# Patient Record
Sex: MALE | Race: WHITE | Employment: OTHER | RURAL
[De-identification: names, ages, dates, MRNs, and addresses within clinical notes are randomized per-mention and may not be internally consistent; named-entity substitution may affect disease eponyms.]

---

## 2015-03-02 LAB — COLONOSCOPY, EXTERNAL: NORMAL

## 2017-04-11 RX ORDER — AMLODIPINE AND OLMESARTAN MEDOXOMIL 5; 20 MG/1; MG/1
TABLET ORAL
Qty: 30 TAB | Refills: 1 | Status: SHIPPED | OUTPATIENT
Start: 2017-04-11 | End: 2017-05-08 | Stop reason: SDUPTHER

## 2017-05-08 ENCOUNTER — OFFICE VISIT (OUTPATIENT)
Dept: FAMILY MEDICINE CLINIC | Age: 57
End: 2017-05-08

## 2017-05-08 VITALS
HEART RATE: 86 BPM | TEMPERATURE: 97.9 F | BODY MASS INDEX: 35.05 KG/M2 | OXYGEN SATURATION: 97 % | WEIGHT: 244.8 LBS | RESPIRATION RATE: 16 BRPM | DIASTOLIC BLOOD PRESSURE: 70 MMHG | SYSTOLIC BLOOD PRESSURE: 120 MMHG | HEIGHT: 70 IN

## 2017-05-08 DIAGNOSIS — E78.2 MIXED HYPERLIPIDEMIA: ICD-10-CM

## 2017-05-08 DIAGNOSIS — Z00.00 ROUTINE ADULT HEALTH MAINTENANCE: Primary | ICD-10-CM

## 2017-05-08 DIAGNOSIS — I10 ESSENTIAL HYPERTENSION: ICD-10-CM

## 2017-05-08 RX ORDER — AMLODIPINE AND OLMESARTAN MEDOXOMIL 5; 20 MG/1; MG/1
TABLET ORAL
Qty: 90 TAB | Refills: 3 | Status: SHIPPED | OUTPATIENT
Start: 2017-05-08 | End: 2018-05-01 | Stop reason: SDUPTHER

## 2017-05-08 NOTE — PROGRESS NOTES
[address] [address] [facility] [place]   [phone]     5/8/2017  Chief Complaint   Patient presents with    Hypertension    Cholesterol Problem    Medication Refill       HPI   is a 56 y.o. male presents today in routine annual follow up. States he is feeling well and is without concern. Review of Systems   Constitutional: Negative. Cardiovascular: Negative. Gastrointestinal: Negative. Neurological: Negative. Patient Active Problem List   Diagnosis Code    Pure hypercholesterolemia E78.00    Idiopathic angioedema T78. 3XXA      No Known Allergies    Current Outpatient Prescriptions on File Prior to Visit   Medication Sig Dispense Refill    predniSONE (DELTASONE) 10 mg tablet Take 1 tablet by mouth daily. PRN as directed 30 tablet 0    omega-3 fatty acids-vitamin e (FISH OIL) 1,000 mg cap Take 1 Cap by mouth.  aspirin delayed-release 81 mg tablet Take  by mouth daily.  cetirizine (ZYRTEC) 10 mg tablet Take  by mouth daily.  rosuvastatin (CRESTOR) 10 mg tablet take 1 tablet by mouth NIGHTLY 90 Tab 3     No current facility-administered medications on file prior to visit. There were no vitals taken for this visit. Physical Exam   Constitutional: He appears well-developed and well-nourished. No distress. Neck: Neck supple. No thyromegaly present. Cardiovascular: Normal heart sounds. Pulmonary/Chest: Effort normal and breath sounds normal.   Abdominal: Soft. There is no tenderness. Vitals reviewed. Sudhir Choudhury was seen today for hypertension, cholesterol problem and medication refill.     Diagnoses and all orders for this visit:    Routine adult health maintenance  -     CBC WITH AUTOMATED DIFF  -     PROSTATE SPECIFIC AG  -     HEMOGLOBIN A1C WITH EAG    Essential hypertension  -     amLODIPine-Olmesartan 5-20 mg tab; take 1 tablet by mouth once daily for pressure  Indications: hypertension    Mixed hyperlipidemia  -     METABOLIC PANEL, COMPREHENSIVE  -     LIPID PANEL WITH LDL/HDL RATIO      Plan         Follow-up Disposition:  Return in about 1 year (around 5/8/2018).       Aysha Berg PA-C, P

## 2017-05-08 NOTE — MR AVS SNAPSHOT
Visit Information Date & Time Provider Department Dept. Phone Encounter #  
 5/8/2017  9:00 AM Angelygold Vuong, [address] [phone] [id number] Upcoming Health Maintenance Date Due Hepatitis C Screening 1960 DTaP/Tdap/Td series (1 - Tdap) 2/6/1983 FOBT Q 1 YEAR AGE 50-75 2/6/2012 INFLUENZA AGE 9 TO ADULT 8/1/2017 Allergies as of 5/8/2017  Review Complete On: 5/8/2017 By: ESTEFANIA Carpenter No Known Allergies Current Immunizations  Never Reviewed No immunizations on file. Not reviewed this visit You Were Diagnosed With   
  
 Codes Comments Routine adult health maintenance    -  Primary ICD-10-CM: Z00.00 ICD-9-CM: V70.0 Essential hypertension     ICD-10-CM: I10 
ICD-9-CM: 401.9 Mixed hyperlipidemia     ICD-10-CM: E78.2 ICD-9-CM: 272.2 Vitals Smoking Status Never Smoker Preferred Pharmacy Pharmacy Name Phone [facility] [address] [facility], [address] [place] [address] Isacc Arboleda [address] Your Updated Medication List  
  
   
This list is accurate as of: 5/8/17  9:27 AM.  Always use your most recent med list. amLODIPine-Olmesartan 5-20 mg Tab  
take 1 tablet by mouth once daily for pressure  Indications: hypertension  
  
 aspirin delayed-release 81 mg tablet Take  by mouth daily. FISH OIL 1,000 mg Cap Generic drug:  omega-3 fatty acids-vitamin e Take 1 Cap by mouth.  
  
 predniSONE 10 mg tablet Commonly known as:  Alice Shiley Take 1 tablet by mouth daily. PRN as directed  
  
 rosuvastatin 10 mg tablet Commonly known as:  CRESTOR  
take 1 tablet by mouth NIGHTLY ZyrTEC 10 mg tablet Generic drug:  cetirizine Take  by mouth daily. Prescriptions Sent to Pharmacy Refills  
 amLODIPine-Olmesartan 5-20 mg tab 3 Sig: take 1 tablet by mouth once daily for pressure  Indications: hypertension Class: Normal  
 Pharmacy: [id number] [place]  #: [phone] We Performed the Following CBC WITH AUTOMATED DIFF [61805 CPT(R)] HEMOGLOBIN A1C WITH EAG [46782 CPT(R)] LIPID PANEL WITH LDL/HDL RATIO [87297 CPT(R)] METABOLIC PANEL, COMPREHENSIVE [08731 CPT(R)] PROSTATE SPECIFIC AG (PSA) [id number] CPT(R)] Introducing \[facility]\"" & HEALTH SERVICES! Steven Arellano introduces U*tique patient portal. Now you can access parts of your medical record, email your doctor's office, and request medication refills online. 1. In your internet browser, go to [email or web address] 2. Click on the First Time User? Click Here link in the Sign In box. You will see the New Member Sign Up page. 3. Enter your U*tique Access Code exactly as it appears below. You will not need to use this code after youve completed the sign-up process. If you do not sign up before the expiration date, you must request a new code. · U*tique Access Code: [email or web address] Expires: 8/6/2017  9:27 AM 
 
4. Enter the last four digits of your Social Security Number (xxxx) and Date of Birth (mm/dd/yyyy) as indicated and click Submit. You will be taken to the next sign-up page. 5. Create a U*tique ID. This will be your U*tique login ID and cannot be changed, so think of one that is secure and easy to remember. 6. Create a U*tique password. You can change your password at any time. 7. Enter your Password Reset Question and Answer. This can be used at a later time if you forget your password. 8. Enter your e-mail address. You will receive e-mail notification when new information is available in [address] 9. Click Sign Up. You can now view and download portions of your medical record. 10. Click the Download Summary menu link to download a portable copy of your medical information.  
 
If you have questions, please visit the Frequently Asked Questions section of the Purple. Remember, [employer] is NOT to be used for urgent needs. For medical emergencies, dial 911. Now available from your iPhone and Android! Please provide this summary of care documentation to your next provider. Your primary care clinician is listed as Kylie Renee. If you have any questions after today's visit, please call [phone].

## 2017-05-09 LAB
ALBUMIN SERPL-MCNC: 4.6 G/DL (ref 3.5–5.5)
ALBUMIN/GLOB SERPL: 1.6 {RATIO} (ref 1.2–2.2)
ALP SERPL-CCNC: 59 IU/L (ref 39–117)
ALT SERPL-CCNC: 26 IU/L (ref 0–44)
AST SERPL-CCNC: 18 IU/L (ref 0–40)
BASOPHILS # BLD AUTO: 0 X10E3/UL (ref 0–0.2)
BASOPHILS NFR BLD AUTO: 0 %
BILIRUB SERPL-MCNC: 0.6 MG/DL (ref 0–1.2)
BUN SERPL-MCNC: 16 MG/DL (ref 6–24)
BUN/CREAT SERPL: 16 (ref 9–20)
CALCIUM SERPL-MCNC: 9.2 MG/DL (ref 8.7–10.2)
CHLORIDE SERPL-SCNC: 98 MMOL/L (ref 96–106)
CHOLEST SERPL-MCNC: 219 MG/DL (ref 100–199)
CO2 SERPL-SCNC: 21 MMOL/L (ref 18–29)
CREAT SERPL-MCNC: 0.98 MG/DL (ref 0.76–1.27)
EOSINOPHIL # BLD AUTO: 0 X10E3/UL (ref 0–0.4)
EOSINOPHIL NFR BLD AUTO: 0 %
ERYTHROCYTE [DISTWIDTH] IN BLOOD BY AUTOMATED COUNT: 14.4 % (ref 12.3–15.4)
EST. AVERAGE GLUCOSE BLD GHB EST-MCNC: 126 MG/DL
GLOBULIN SER CALC-MCNC: 2.8 G/DL (ref 1.5–4.5)
GLUCOSE SERPL-MCNC: 106 MG/DL (ref 65–99)
HBA1C MFR BLD: 6 % (ref 4.8–5.6)
HCT VFR BLD AUTO: 42.1 % (ref 37.5–51)
HDLC SERPL-MCNC: 66 MG/DL
HGB BLD-MCNC: 14.1 G/DL (ref 12.6–17.7)
IMM GRANULOCYTES # BLD: 0.1 X10E3/UL (ref 0–0.1)
IMM GRANULOCYTES NFR BLD: 1 %
LDLC SERPL CALC-MCNC: 105 MG/DL (ref 0–99)
LDLC/HDLC SERPL: 1.6 RATIO UNITS (ref 0–3.6)
LYMPHOCYTES # BLD AUTO: 1 X10E3/UL (ref 0.7–3.1)
LYMPHOCYTES NFR BLD AUTO: 12 %
MCH RBC QN AUTO: 28.7 PG (ref 26.6–33)
MCHC RBC AUTO-ENTMCNC: 33.5 G/DL (ref 31.5–35.7)
MCV RBC AUTO: 86 FL (ref 79–97)
MONOCYTES # BLD AUTO: 0.5 X10E3/UL (ref 0.1–0.9)
MONOCYTES NFR BLD AUTO: 7 %
NEUTROPHILS # BLD AUTO: 6.7 X10E3/UL (ref 1.4–7)
NEUTROPHILS NFR BLD AUTO: 80 %
PLATELET # BLD AUTO: 245 X10E3/UL (ref 150–379)
POTASSIUM SERPL-SCNC: 4.5 MMOL/L (ref 3.5–5.2)
PROT SERPL-MCNC: 7.4 G/DL (ref 6–8.5)
PSA SERPL-MCNC: 0.5 NG/ML (ref 0–4)
RBC # BLD AUTO: 4.91 X10E6/UL (ref 4.14–5.8)
SODIUM SERPL-SCNC: 137 MMOL/L (ref 134–144)
TRIGL SERPL-MCNC: 240 MG/DL (ref 0–149)
VLDLC SERPL CALC-MCNC: 48 MG/DL (ref 5–40)
WBC # BLD AUTO: 8.4 X10E3/UL (ref 3.4–10.8)

## 2017-05-12 NOTE — PROGRESS NOTES
Cholesterol, triglyceride and LDL levels are elevated but improved from 1 year ago. HDL (good cholesterol) is excellent. Keep up the good work. Blood sugar level is mildly elevated. Early diabetes. Minimize carbohydrates in your diet.    Repeat annually

## 2018-04-27 DIAGNOSIS — I10 ESSENTIAL HYPERTENSION: ICD-10-CM

## 2018-04-27 RX ORDER — ROSUVASTATIN CALCIUM 10 MG/1
TABLET, COATED ORAL
Qty: 90 TAB | Refills: 3 | OUTPATIENT
Start: 2018-04-27

## 2018-04-27 RX ORDER — AMLODIPINE AND OLMESARTAN MEDOXOMIL 5; 20 MG/1; MG/1
TABLET ORAL
Qty: 90 TAB | Refills: 3 | OUTPATIENT
Start: 2018-04-27

## 2018-05-01 ENCOUNTER — OFFICE VISIT (OUTPATIENT)
Dept: FAMILY MEDICINE CLINIC | Age: 58
End: 2018-05-01

## 2018-05-01 VITALS
DIASTOLIC BLOOD PRESSURE: 60 MMHG | HEIGHT: 70 IN | WEIGHT: 250 LBS | BODY MASS INDEX: 35.79 KG/M2 | SYSTOLIC BLOOD PRESSURE: 102 MMHG | RESPIRATION RATE: 16 BRPM | HEART RATE: 60 BPM | TEMPERATURE: 97 F | OXYGEN SATURATION: 97 %

## 2018-05-01 DIAGNOSIS — E66.01 SEVERE OBESITY (BMI 35.0-39.9) WITH COMORBIDITY (HCC): ICD-10-CM

## 2018-05-01 DIAGNOSIS — E78.00 PURE HYPERCHOLESTEROLEMIA: ICD-10-CM

## 2018-05-01 DIAGNOSIS — I10 ESSENTIAL HYPERTENSION: Primary | ICD-10-CM

## 2018-05-01 DIAGNOSIS — Z11.59 ENCOUNTER FOR HEPATITIS C SCREENING TEST FOR LOW RISK PATIENT: ICD-10-CM

## 2018-05-01 RX ORDER — AMLODIPINE AND OLMESARTAN MEDOXOMIL 5; 20 MG/1; MG/1
TABLET ORAL
Qty: 90 TAB | Refills: 3 | Status: SHIPPED | OUTPATIENT
Start: 2018-05-01 | End: 2019-04-16 | Stop reason: SDUPTHER

## 2018-05-01 RX ORDER — ROSUVASTATIN CALCIUM 10 MG/1
TABLET, COATED ORAL
Qty: 90 TAB | Refills: 3 | Status: SHIPPED | OUTPATIENT
Start: 2018-05-01 | End: 2019-04-16 | Stop reason: SDUPTHER

## 2018-05-01 NOTE — PROGRESS NOTES
1. Have you been to the ER, urgent care clinic since your last visit? Hospitalized since your last visit? No    2. Have you seen or consulted any other health care providers outside of the [address] [place] [address] since your last visit? Include any pap smears or colon screening.  No

## 2018-05-01 NOTE — ACP (ADVANCE CARE PLANNING)
Discussed importance of advanced medical directives with patient. Patient is capable of making decisions.   Astrid OLEARY

## 2018-05-01 NOTE — PROGRESS NOTES
Subjective:   Segundo Pruitt is a ribera  Brianne Bill is a 66 y.o. male who presents today with the following:  Chief Complaint   Patient presents with    Hypertension     checkup and refills    Hypertension       Patient Active Problem List   Diagnosis Code    Pure hypercholesterolemia E78.00    Idiopathic angioedema T78. 3XXA    Severe obesity (BMI 35.0-39. 9) with comorbidity ([facility] [address] E66.01         COMPLIANT WITH MEDICATION:   HTN/ hypercholesterolemia; Denies chest pain, dyspnea, palpitations, headache and blurred vision. Blood pressure normotensive. BMI/ Obesity: discussed BMI with him      ROS:  Gen: denies fever, chills, fatigue, weight loss, weight gain  HEENT:denies blurry vision, nasal congestion, sore throat  Resp: denies dypsnea, cough, wheezing  CV: denies chest pain radiating to the jaws or arms, palpitations, lower extremity edema  Abd: denies nausea, vomiting, diarrhea, constipation  Neuro: denies numbness/tingling  Endo: denies polyuria, polydipsia, heat/cold intolerance  Heme: no lymphadenopathy    No Known Allergies      Current Outpatient Prescriptions:     rosuvastatin (CRESTOR) 10 mg tablet, take 1 tablet by mouth NIGHTLY, Disp: 90 Tab, Rfl: 3    amLODIPine-Olmesartan 5-20 mg tab, take 1 tablet by mouth once daily for pressure  Indications: hypertension, Disp: 90 Tab, Rfl: 3    predniSONE (DELTASONE) 10 mg tablet, Take 1 tablet by mouth daily. PRN as directed, Disp: 30 tablet, Rfl: 0    omega-3 fatty acids-vitamin e (FISH OIL) 1,000 mg cap, Take 1 Cap by mouth., Disp: , Rfl:     aspirin delayed-release 81 mg tablet, Take  by mouth daily. , Disp: , Rfl:     cetirizine (ZYRTEC) 10 mg tablet, Take  by mouth daily. , Disp: , Rfl:     Past Medical History:   Diagnosis Date    Hypercholesterolemia     Hypertension     Idiopathic angioedema     obesity        Past Surgical History:   Procedure Laterality Date    HX COLONOSCOPY  4/15    SIS 5 years()       History   Smoking Status    Never Smoker   Smokeless Tobacco    Never Used       Social History     Social History    Marital status: SINGLE     Spouse name: N/A    Number of children: N/A    Years of education: N/A     Social History Main Topics    Smoking status: Never Smoker    Smokeless tobacco: Never Used    Alcohol use Yes    Drug use: No    Sexual activity: Yes     Other Topics Concern    None     Social History Narrative       Family History   Problem Relation Age of Onset    Diabetes Mother     Cancer Brother      colon         Objective:     Visit Vitals    /60 (BP 1 Location: Left arm, BP Patient Position: Sitting)    Pulse 60    Temp 97 °F (36.1 °C) (Temporal)    Resp 16    Ht 5' 9.5\" (1.765 m)    Wt 250 lb (113.4 kg)    SpO2 97%    BMI 36.39 kg/m2     Body mass index is 36.39 kg/(m^2). General: Alert and oriented. No acute distress. Well nourished  HEENT :  Ears:TMs are normal. Canals are clear. Eyes: pupils equal, round, react to light and accommodation. Extra ocular movements intact. Nose: patent. Mouth and throat is clear. Neck:supple full range of motion no thyromegaly. Trachea midline, No carotid bruits. No significant lymphadenopathy  Lungs[de-identified] clear to auscultation without wheezes, rales, or rhonchi. Heart :RRR, S1 & S2 are normal intensity. No murmur; no gallop  Abdomen: bowel sounds active. No tenderness, guarding, rebound, masses, hepatic or spleen enlargement  Back: no CVA tenderness. Extremities: without clubbing, cyanosis, or edema  Pulses: radial and femoral pulses are normal  Neuro: HMF intact.  Cranial nerves II through XII grossly normal.  Motor: is 5 over 5 and symmetrical.   Deep tendon reflexes: +2 equal    Results for orders placed or performed in visit on 05/08/17   CBC WITH AUTOMATED DIFF   Result Value Ref Range    WBC 8.4 3.4 - 10.8 x10E3/uL    RBC 4.91 4.14 - 5.80 x10E6/uL    HGB 14.1 12.6 - 17.7 g/dL    HCT 42.1 37.5 - 51.0 %    MCV 86 79 - 97 fL    MCH 28.7 26.6 - 33.0 pg MCHC 33.5 31.5 - 35.7 g/dL    RDW 14.4 12.3 - 15.4 %    PLATELET 528 230 - 372 x10E3/uL    NEUTROPHILS 80 %    Lymphocytes 12 %    MONOCYTES 7 %    EOSINOPHILS 0 %    BASOPHILS 0 %    ABS. NEUTROPHILS 6.7 1.4 - 7.0 x10E3/uL    Abs Lymphocytes 1.0 0.7 - 3.1 x10E3/uL    ABS. MONOCYTES 0.5 0.1 - 0.9 x10E3/uL    ABS. EOSINOPHILS 0.0 0.0 - 0.4 x10E3/uL    ABS. BASOPHILS 0.0 0.0 - 0.2 x10E3/uL    IMMATURE GRANULOCYTES 1 %    ABS. IMM. GRANS. 0.1 0.0 - 0.1 J85P3/JL   METABOLIC PANEL, COMPREHENSIVE   Result Value Ref Range    Glucose 106 (H) 65 - 99 mg/dL    BUN 16 6 - 24 mg/dL    Creatinine 0.98 0.76 - 1.27 mg/dL    GFR est non-AA 86 >59 mL/min/1.73    GFR est  >59 mL/min/1.73    BUN/Creatinine ratio 16 9 - 20    Sodium 137 134 - 144 mmol/L    Potassium 4.5 3.5 - 5.2 mmol/L    Chloride 98 96 - 106 mmol/L    CO2 21 18 - 29 mmol/L    Calcium 9.2 8.7 - 10.2 mg/dL    Protein, total 7.4 6.0 - 8.5 g/dL    Albumin 4.6 3.5 - 5.5 g/dL    GLOBULIN, TOTAL 2.8 1.5 - 4.5 g/dL    A-G Ratio 1.6 1.2 - 2.2    Bilirubin, total 0.6 0.0 - 1.2 mg/dL    Alk. phosphatase 59 39 - 117 IU/L    AST (SGOT) 18 0 - 40 IU/L    ALT (SGPT) 26 0 - 44 IU/L   PROSTATE SPECIFIC AG (PSA)   Result Value Ref Range    Prostate Specific Ag 0.5 0.0 - 4.0 ng/mL   LIPID PANEL WITH LDL/HDL RATIO   Result Value Ref Range    Cholesterol, total 219 (H) 100 - 199 mg/dL    Triglyceride 240 (H) 0 - 149 mg/dL    HDL Cholesterol 66 >39 mg/dL    VLDL, calculated 48 (H) 5 - 40 mg/dL    LDL, calculated 105 (H) 0 - 99 mg/dL    LDL/HDL Ratio 1.6 0.0 - 3.6 ratio units   HEMOGLOBIN A1C WITH EAG   Result Value Ref Range    Hemoglobin A1c 6.0 (H) 4.8 - 5.6 %    Estimated average glucose 126 mg/dL       No results found for this visit on 05/01/18. Assessment/ Plan:     1. Essential hypertension    - amLODIPine-Olmesartan 5-20 mg tab; take 1 tablet by mouth once daily for pressure  Indications: hypertension  Dispense: 90 Tab;  Refill: 3  - LIPID PANEL  - METABOLIC PANEL, COMPREHENSIVE  - PROSTATE SPECIFIC AG  - TSH 3RD GENERATION  - CBC WITH AUTOMATED DIFF  - COLLECTION VENOUS BLOOD,VENIPUNCTURE    2. BMI 35.0-35.9,adult  Discussed the patient's BMI with him. The BMI follow up plan is as follows:     dietary management education, guidance, and counseling  encourage exercise  monitor weight  prescribed dietary intake    An After Visit Summary was printed and given to the patient.  - LIPID PANEL  - METABOLIC PANEL, COMPREHENSIVE  - PROSTATE SPECIFIC AG  - TSH 3RD GENERATION  - CBC WITH AUTOMATED DIFF  - COLLECTION VENOUS BLOOD,VENIPUNCTURE    3. Severe obesity (BMI 35.0-39. 9) with comorbidity ([address]  Discussed the patient's BMI with him. The BMI follow up plan is as follows:     dietary management education, guidance, and counseling  encourage exercise  monitor weight  prescribed dietary intake    An After Visit Summary was printed and given to the patient.  - LIPID PANEL  - METABOLIC PANEL, COMPREHENSIVE  - PROSTATE SPECIFIC AG  - TSH 3RD GENERATION  - CBC WITH AUTOMATED DIFF  - COLLECTION VENOUS BLOOD,VENIPUNCTURE    4. Pure hypercholesterolemia    - LIPID PANEL  - METABOLIC PANEL, COMPREHENSIVE  - PROSTATE SPECIFIC AG  - TSH 3RD GENERATION  - CBC WITH AUTOMATED DIFF  - COLLECTION VENOUS BLOOD,VENIPUNCTURE    5.  Encounter for hepatitis C screening test for low risk patient    - HEPATITIS C AB      Orders Placed This Encounter    COLLECTION VENOUS BLOOD,VENIPUNCTURE    LIPID PANEL    METABOLIC PANEL, COMPREHENSIVE    PROSTATE SPECIFIC AG    TSH 3RD GENERATION    CBC WITH AUTOMATED DIFF    HEPATITIS C AB    rosuvastatin (CRESTOR) 10 mg tablet     Sig: take 1 tablet by mouth NIGHTLY     Dispense:  90 Tab     Refill:  3    amLODIPine-Olmesartan 5-20 mg tab     Sig: take 1 tablet by mouth once daily for pressure  Indications: hypertension     Dispense:  90 Tab     Refill:  3         Verbal and written instructions (see AVS) provided.  Patient expresses understanding of diagnosis and treatment plan. Health Maintenance Due   Topic Date Due    Hepatitis C Screening  1960     Declined TD immunization today     Follow-up Disposition:  Return in about 1 year (around 5/1/2019). or sooner as needed.        Veena Jackson, FNP-C

## 2018-05-01 NOTE — MR AVS SNAPSHOT
[facility] 
 
 
  [address] [place] [address] [employer] [address] 
[phone] Patient: Enid Solorzano MRN: [id number] :1960 Visit Information Date & Time Provider Department Dept. Phone Encounter #  
 2018  8:00 AM Smith Sanches NP [facility] [address] [facility] [phone] [id number] Follow-up Instructions Return in about 1 year (around 2019). Upcoming Health Maintenance Date Due Hepatitis C Screening 1960 DTaP/Tdap/Td series (1 - Tdap) 10/30/2018* FOBT Q 1 YEAR AGE 50-75 10/30/2018* Influenza Age 5 to Adult 2018 *Topic was postponed. The date shown is not the original due date. Allergies as of 2018  Review Complete On: 2018 By: Smith Sanches NP No Known Allergies Current Immunizations  Never Reviewed No immunizations on file. Not reviewed this visit You Were Diagnosed With   
  
 Codes Comments Essential hypertension    -  Primary ICD-10-CM: I10 
ICD-9-CM: 401.9 BMI 35.0-35.9,adult     ICD-10-CM: W23.48 ICD-9-CM: V85.35 Severe obesity (BMI 35.0-39. 9) with comorbidity ([facility] [address]     ICD-10-CM: E66.01 
ICD-9-CM: 278.01   
 Pure hypercholesterolemia     ICD-10-CM: E78.00 ICD-9-CM: 272.0 Encounter for hepatitis C screening test for low risk patient     ICD-10-CM: Z11.59 
ICD-9-CM: V73.89 Vitals BP Pulse Temp Resp Height(growth percentile) Weight(growth percentile) 102/60 (BP 1 Location: Left arm, BP Patient Position: Sitting) 60 97 °F (36.1 °C) (Temporal) 16 5' 9.5\" (1.765 m) 250 lb (113.4 kg) SpO2 BMI Smoking Status 97% 36.39 kg/m2 Never Smoker BMI and BSA Data Body Mass Index Body Surface Area  
 36.39 kg/m 2 2.36 m 2 Preferred Pharmacy Pharmacy Name Phone [facility] [address] [facility], [address] [place] [address] Isacc Arboleda [address] Your Updated Medication List  
  
   
 This list is accurate as of 5/1/18  8:52 AM.  Always use your most recent med list. amLODIPine-Olmesartan 5-20 mg Tab  
take 1 tablet by mouth once daily for pressure  Indications: hypertension  
  
 aspirin delayed-release 81 mg tablet Take  by mouth daily. FISH OIL 1,000 mg Cap Generic drug:  omega-3 fatty acids-vitamin e Take 1 Cap by mouth.  
  
 predniSONE 10 mg tablet Commonly known as:  Gloria Gong Take 1 tablet by mouth daily. PRN as directed  
  
 rosuvastatin 10 mg tablet Commonly known as:  CRESTOR  
take 1 tablet by mouth NIGHTLY ZyrTEC 10 mg tablet Generic drug:  cetirizine Take  by mouth daily. Prescriptions Sent to Pharmacy Refills  
 rosuvastatin (CRESTOR) 10 mg tablet 3 Sig: take 1 tablet by mouth NIGHTLY Class: Normal  
 Pharmacy: [id number] [facility] [address] [facility] Ph #: [phone]  
 amLODIPine-Olmesartan 5-20 mg tab 3 Sig: take 1 tablet by mouth once daily for pressure  Indications: hypertension Class: Normal  
 Pharmacy: [id number] [place] [address] Ph #: [phone] We Performed the Following CBC WITH AUTOMATED DIFF [84768 CPT(R)] COLLECTION VENOUS BLOOD,VENIPUNCTURE [id number] CPT(R)] HEPATITIS C AB [58882 CPT(R)] LIPID PANEL [86810 CPT(R)] METABOLIC PANEL, COMPREHENSIVE [65246 CPT(R)] PSA, DIAGNOSTIC (PROSTATE SPECIFIC AG) [id number] CPT(R)] TSH 3RD GENERATION [45475 CPT(R)] Follow-up Instructions Return in about 1 year (around 5/1/2019). Introducing [facility] & HEALTH SERVICES! [facility] introduces [employer] patient portal. Now you can access parts of your medical record, email your doctor's office, and request medication refills online. 1. In your internet browser, go to [email or web address] 2. Click on the First Time User? Click Here link in the Sign In box.  You will see the New Member Sign Up page. 3. Enter your Leadspace Access Code exactly as it appears below. You will not need to use this code after youve completed the sign-up process. If you do not sign up before the expiration date, you must request a new code. · [employer] Access Code: [email or web address] Expires: 7/30/2018  8:44 AM 
 
4. Enter the last four digits of your Social Security Number (xxxx) and Date of Birth (mm/dd/yyyy) as indicated and click Submit. You will be taken to the next sign-up page. 5. Create a Friends[employer]t ID. This will be your Leadspace login ID and cannot be changed, so think of one that is secure and easy to remember. 6. Create a Leadspace password. You can change your password at any time. 7. Enter your Password Reset Question and Answer. This can be used at a later time if you forget your password. 8. Enter your e-mail address. You will receive e-mail notification when new information is available in [address] 9. Click Sign Up. You can now view and download portions of your medical record. 10. Click the Download Summary menu link to download a portable copy of your medical information. If you have questions, please visit the Frequently Asked Questions section of the Leadspace website. Remember, [employer] is NOT to be used for urgent needs. For medical emergencies, dial 911. Now available from your iPhone and Android! Please provide this summary of care documentation to your next provider. Your primary care clinician is listed as Angela Renee. If you have any questions after today's visit, please call [phone].

## 2018-05-02 LAB
ALBUMIN SERPL-MCNC: 4.5 G/DL (ref 3.5–5.5)
ALBUMIN/GLOB SERPL: 1.7 {RATIO} (ref 1.2–2.2)
ALP SERPL-CCNC: 57 IU/L (ref 39–117)
ALT SERPL-CCNC: 25 IU/L (ref 0–44)
AST SERPL-CCNC: 21 IU/L (ref 0–40)
BASOPHILS # BLD AUTO: 0 X10E3/UL (ref 0–0.2)
BASOPHILS NFR BLD AUTO: 0 %
BILIRUB SERPL-MCNC: 0.4 MG/DL (ref 0–1.2)
BUN SERPL-MCNC: 21 MG/DL (ref 6–24)
BUN/CREAT SERPL: 21 (ref 9–20)
CALCIUM SERPL-MCNC: 9.1 MG/DL (ref 8.7–10.2)
CHLORIDE SERPL-SCNC: 98 MMOL/L (ref 96–106)
CHOLEST SERPL-MCNC: 266 MG/DL (ref 100–199)
CO2 SERPL-SCNC: 22 MMOL/L (ref 18–29)
CREAT SERPL-MCNC: 0.98 MG/DL (ref 0.76–1.27)
EOSINOPHIL # BLD AUTO: 0.3 X10E3/UL (ref 0–0.4)
EOSINOPHIL NFR BLD AUTO: 4 %
ERYTHROCYTE [DISTWIDTH] IN BLOOD BY AUTOMATED COUNT: 14.7 % (ref 12.3–15.4)
GFR SERPLBLD CREATININE-BSD FMLA CKD-EPI: 86 ML/MIN/1.73
GFR SERPLBLD CREATININE-BSD FMLA CKD-EPI: 99 ML/MIN/1.73
GLOBULIN SER CALC-MCNC: 2.6 G/DL (ref 1.5–4.5)
GLUCOSE SERPL-MCNC: 112 MG/DL (ref 65–99)
HCT VFR BLD AUTO: 41.4 % (ref 37.5–51)
HCV AB S/CO SERPL IA: <0.1 S/CO RATIO (ref 0–0.9)
HDLC SERPL-MCNC: 52 MG/DL
HGB BLD-MCNC: 13.9 G/DL (ref 13–17.7)
IMM GRANULOCYTES # BLD: 0 X10E3/UL (ref 0–0.1)
IMM GRANULOCYTES NFR BLD: 0 %
LDLC SERPL CALC-MCNC: ABNORMAL MG/DL (ref 0–99)
LYMPHOCYTES # BLD AUTO: 1.4 X10E3/UL (ref 0.7–3.1)
LYMPHOCYTES NFR BLD AUTO: 16 %
MCH RBC QN AUTO: 29.1 PG (ref 26.6–33)
MCHC RBC AUTO-ENTMCNC: 33.6 G/DL (ref 31.5–35.7)
MCV RBC AUTO: 87 FL (ref 79–97)
MONOCYTES # BLD AUTO: 0.7 X10E3/UL (ref 0.1–0.9)
MONOCYTES NFR BLD AUTO: 8 %
NEUTROPHILS # BLD AUTO: 6.2 X10E3/UL (ref 1.4–7)
NEUTROPHILS NFR BLD AUTO: 72 %
PLATELET # BLD AUTO: 229 X10E3/UL (ref 150–379)
POTASSIUM SERPL-SCNC: 4.4 MMOL/L (ref 3.5–5.2)
PROT SERPL-MCNC: 7.1 G/DL (ref 6–8.5)
PSA SERPL-MCNC: 0.7 NG/ML (ref 0–4)
RBC # BLD AUTO: 4.77 X10E6/UL (ref 4.14–5.8)
SODIUM SERPL-SCNC: 138 MMOL/L (ref 134–144)
TRIGL SERPL-MCNC: 673 MG/DL (ref 0–149)
TSH SERPL DL<=0.005 MIU/L-ACNC: 1.42 UIU/ML (ref 0.45–4.5)
VLDLC SERPL CALC-MCNC: ABNORMAL MG/DL (ref 5–40)
WBC # BLD AUTO: 8.6 X10E3/UL (ref 3.4–10.8)

## 2018-05-04 NOTE — PROGRESS NOTES
Triglycerides markedly elevated. Drastically decrease high fat diet. Consider medication fenofibrate medication  Recheck in 8 weeks. with diet and exercise  Metabolic panel good kidney and liver   Thyroid level and PSA Level WNL

## 2018-05-07 NOTE — PROGRESS NOTES
Patient informed will try diet and exercise and return in 2 months to repeat levels. Diet information mailed to patient.

## 2019-04-16 DIAGNOSIS — I10 ESSENTIAL HYPERTENSION: ICD-10-CM

## 2019-04-19 RX ORDER — AMLODIPINE AND OLMESARTAN MEDOXOMIL 5; 20 MG/1; MG/1
TABLET ORAL
Qty: 90 TAB | Refills: 3 | Status: SHIPPED | OUTPATIENT
Start: 2019-04-19 | End: 2020-04-20

## 2019-04-19 RX ORDER — ROSUVASTATIN CALCIUM 10 MG/1
TABLET, COATED ORAL
Qty: 90 TAB | Refills: 3 | Status: SHIPPED | OUTPATIENT
Start: 2019-04-19 | End: 2020-04-20

## 2022-03-19 PROBLEM — E66.01 SEVERE OBESITY (BMI 35.0-39.9) WITH COMORBIDITY (HCC): Status: ACTIVE | Noted: 2018-05-01

## 2022-03-31 ENCOUNTER — OFFICE VISIT (OUTPATIENT)
Dept: FAMILY MEDICINE CLINIC | Age: 62
End: 2022-03-31
Payer: COMMERCIAL

## 2022-03-31 VITALS
RESPIRATION RATE: 18 BRPM | TEMPERATURE: 98 F | BODY MASS INDEX: 37.08 KG/M2 | HEIGHT: 70 IN | DIASTOLIC BLOOD PRESSURE: 76 MMHG | SYSTOLIC BLOOD PRESSURE: 120 MMHG | WEIGHT: 259 LBS | OXYGEN SATURATION: 99 % | HEART RATE: 82 BPM

## 2022-03-31 DIAGNOSIS — R35.1 NOCTURIA: ICD-10-CM

## 2022-03-31 DIAGNOSIS — E66.01 SEVERE OBESITY (BMI 35.0-39.9) WITH COMORBIDITY (HCC): ICD-10-CM

## 2022-03-31 DIAGNOSIS — Z00.00 WELLNESS EXAMINATION: Primary | ICD-10-CM

## 2022-03-31 DIAGNOSIS — I10 ESSENTIAL HYPERTENSION: ICD-10-CM

## 2022-03-31 PROCEDURE — 99396 PREV VISIT EST AGE 40-64: CPT | Performed by: NURSE PRACTITIONER

## 2022-03-31 RX ORDER — ROSUVASTATIN CALCIUM 10 MG/1
10 TABLET, COATED ORAL EVERY EVENING
Qty: 90 TABLET | Refills: 3 | Status: SHIPPED | OUTPATIENT
Start: 2022-03-31 | End: 2022-04-06 | Stop reason: SDUPTHER

## 2022-03-31 RX ORDER — AMLODIPINE AND OLMESARTAN MEDOXOMIL 5; 20 MG/1; MG/1
1 TABLET ORAL DAILY
Qty: 90 TABLET | Refills: 3 | Status: SHIPPED | OUTPATIENT
Start: 2022-03-31 | End: 2022-07-11 | Stop reason: SDUPTHER

## 2022-03-31 NOTE — PROGRESS NOTES
Chief Complaint   Patient presents with    Medication Evaluation     check up       HPI:     is a 63 y.o. male in today for his yearly check up. 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 HTN: Compliant with amlodipine-olmesartan daily. Denies CP, HA, visual changes. He does not monitor BP at home. HLD: Complaint with rosuvastatin, not fasting today. Denies any s/e. New issues: Doing well, wants yearly labs. Had a mild bout with COVID in January, unvaccinated. No Known Allergies    Current Outpatient Medications   Medication Sig    amLODIPine-Olmesartan 5-20 mg tab Take 1 Tablet by mouth daily.  rosuvastatin (CRESTOR) 10 mg tablet Take 1 Tablet by mouth every evening.  cetirizine (ZYRTEC) 10 mg tablet Take  by mouth daily. No current facility-administered medications for this visit. Past Medical History:   Diagnosis Date    Hypercholesterolemia     Hypertension     Idiopathic angioedema     obesity        Family History   Problem Relation Age of Onset    Diabetes Mother     Cancer Brother         colon       ROS:  Denies fever, chills, cough, chest pain, SOB,  nausea, vomiting, diarrhea, dysuria. Denies rashes, wounds, arthralgias, weakness, numbness, visual changes, depression. Denies wt loss, wt gain, hemoptysis, hematochezia or melena. Patient is not experiencing chest pain radiating to the jaw and/or down the arms.     Physical Examination:    /76 (BP 1 Location: Right arm, BP Patient Position: Sitting, BP Cuff Size: Adult)   Pulse 82   Temp 98 °F (36.7 °C) (Temporal)   Resp 18   Ht 5' 9.5\" (1.765 m)   Wt 259 lb (117.5 kg)   SpO2 99%   BMI 37.70 kg/m²     Wt Readings from Last 3 Encounters:   03/31/22 259 lb (117.5 kg)   02/24/20 259 lb (117.5 kg)   08/02/19 256 lb (116.1 kg)     Constitutional: WDWN Male in no acute distress  HENT:  NC/AT, TMs pearly gray, OP: clear  EYES: EOMI, PERRL  Neck:  Supple, no JVD, mass or bruit. No thyromegaly. Respiratory:  Respirations even and unlabored without use of accessory muscles, CTA throughout without wheezes, rales, rubs or rhonchi. Symmetrical chest expansion. Cardiac: RRR no clicks, murmurs, gallops, or rubs  Musculoskeletal:  No cyanosis, clubbing or edema of extremities. Moves all extremities without difficulty. Neurologic:  Smooth, even gait without assistance, CN 2-12 grossly intact. Skin: intact and warm to the touch, no rash   Lymphadenopathy: no cervical or supraclavicular nodes  Psych: Pleasant and appropriate. Judgment normal. Alert and oriented x 3. ASSESSMENT AND PLAN:       ICD-10-CM ICD-9-CM    1. Wellness examination  Z00.00 V70.0 COLLECTION VENOUS BLOOD,VENIPUNCTURE      CBC WITH AUTOMATED DIFF      LIPID PANEL      METABOLIC PANEL, COMPREHENSIVE      TSH 3RD GENERATION      CBC WITH AUTOMATED DIFF      LIPID PANEL      METABOLIC PANEL, COMPREHENSIVE      TSH 3RD GENERATION   2. Severe obesity (BMI 35.0-39. 9) with comorbidity ([facility] [address]  E66.01 278.01    3. Essential hypertension  I10 401.9 COLLECTION VENOUS BLOOD,VENIPUNCTURE      CBC WITH AUTOMATED DIFF      LIPID PANEL      METABOLIC PANEL, COMPREHENSIVE      TSH 3RD GENERATION      amLODIPine-Olmesartan 5-20 mg tab      CBC WITH AUTOMATED DIFF      LIPID PANEL      METABOLIC PANEL, COMPREHENSIVE      TSH 3RD GENERATION   4. Nocturia  R35.1 788.43 COLLECTION VENOUS BLOOD,VENIPUNCTURE      PSA, DIAGNOSTIC (PROSTATE SPECIFIC AG)      PSA, DIAGNOSTIC (PROSTATE SPECIFIC AG)     BP excellent, weight stable. BMI elevated, work on exercise, weight loss. Labs as above, refill chronic meds. He declines vaccinations today. Cologuard order completed. Medication Side Effects and Warnings were discussed with patient:  yes  Patient Labs were reviewed:  yes  Patient Past Records were reviewed:  yes    Patient aware of plan of care and verbalized understanding. Questions answered.  RTC annually, or sooner if needed.     Robert Alvarado, NP

## 2022-03-31 NOTE — PATIENT INSTRUCTIONS
Well Visit, Men 48 to 72: Care Instructions  Overview     Well visits can help you stay healthy. Your doctor has checked your overall health and may have suggested ways to take good care of yourself. Your doctor also may have recommended tests. At home, you can help prevent illness with healthy eating, regular exercise, and other steps. Follow-up care is a key part of your treatment and safety. Be sure to make and go to all appointments, and call your doctor if you are having problems. It's also a good idea to know your test results and keep a list of the medicines you take. How can you care for yourself at home? · Get screening tests that you and your doctor decide on. Screening helps find diseases before any symptoms appear. · Eat healthy foods. Choose fruits, vegetables, whole grains, protein, and low-fat dairy foods. Limit fat, especially saturated fat. Reduce salt in your diet. · Limit alcohol. Have no more than 2 drinks a day or 14 drinks a week. · Get at least 30 minutes of exercise on most days of the week. Walking is a good choice. You also may want to do other activities, such as running, swimming, cycling, or playing tennis or team sports. · Reach and stay at a healthy weight. This will lower your risk for many problems, such as obesity, diabetes, heart disease, and high blood pressure. · Do not smoke. Smoking can make health problems worse. If you need help quitting, talk to your doctor about stop-smoking programs and medicines. These can increase your chances of quitting for good. · Care for your mental health. It is easy to get weighed down by worry and stress. Learn strategies to manage stress, like deep breathing and mindfulness, and stay connected with your family and community. If you find you often feel sad or hopeless, talk with your doctor. Treatment can help. · Talk to your doctor about whether you have any risk factors for sexually transmitted infections (STIs).  You can help prevent STIs if you wait to have sex with a new partner (or partners) until you've each been tested for STIs. It also helps if you use condoms (male or female condoms) and if you limit your sex partners to one person who only has sex with you. Vaccines are available for some STIs. · If it's important to you to prevent pregnancy with your partner, talk with your doctor about birth control options that might be best for you. · If you think you may have a problem with alcohol or drug use, talk to your doctor. This includes prescription medicines (such as amphetamines and opioids) and illegal drugs (such as cocaine and methamphetamine). Your doctor can help you figure out what type of treatment is best for you. · Protect your skin from too much sun. When you're outdoors from 10 a.m. to 4 p.m., stay in the shade or cover up with clothing and a hat with a wide brim. Wear sunglasses that block UV rays. Even when it's cloudy, put broad-spectrum sunscreen (SPF 30 or higher) on any exposed skin. · See a dentist one or two times a year for checkups and to have your teeth cleaned. · Wear a seat belt in the car. When should you call for help? Watch closely for changes in your health, and be sure to contact your doctor if you have any problems or symptoms that concern you. Where can you learn more? Go to http://www.gray.com/  Enter S473 in the search box to learn more about \"Well Visit, Men 48 to 72: Care Instructions. \"  Current as of: October 6, 2021               Content Version: 13.2  © 0763-6706 [employer]. Care instructions adapted under license by [employer] (which disclaims liability or warranty for this information). If you have questions about a medical condition or this instruction, always ask your healthcare professional. [address] any warranty or liability for your use of this information.

## 2022-04-02 LAB
ALBUMIN SERPL-MCNC: 4.7 G/DL (ref 3.8–4.9)
ALBUMIN/GLOB SERPL: 1.7 {RATIO} (ref 1.2–2.2)
ALP SERPL-CCNC: 60 IU/L (ref 44–121)
ALT SERPL-CCNC: 28 IU/L (ref 0–44)
AST SERPL-CCNC: 25 IU/L (ref 0–40)
BASOPHILS # BLD AUTO: 0.1 X10E3/UL (ref 0–0.2)
BASOPHILS NFR BLD AUTO: 1 %
BILIRUB SERPL-MCNC: 0.4 MG/DL (ref 0–1.2)
BUN SERPL-MCNC: 10 MG/DL (ref 8–27)
BUN/CREAT SERPL: 10 (ref 10–24)
CALCIUM SERPL-MCNC: 9.7 MG/DL (ref 8.6–10.2)
CHLORIDE SERPL-SCNC: 102 MMOL/L (ref 96–106)
CHOLEST SERPL-MCNC: 259 MG/DL (ref 100–199)
CO2 SERPL-SCNC: 20 MMOL/L (ref 20–29)
CREAT SERPL-MCNC: 1.02 MG/DL (ref 0.76–1.27)
EGFR: 84 ML/MIN/1.73
EOSINOPHIL # BLD AUTO: 0.3 X10E3/UL (ref 0–0.4)
EOSINOPHIL NFR BLD AUTO: 5 %
ERYTHROCYTE [DISTWIDTH] IN BLOOD BY AUTOMATED COUNT: 15 % (ref 11.6–15.4)
GLOBULIN SER CALC-MCNC: 2.7 G/DL (ref 1.5–4.5)
GLUCOSE SERPL-MCNC: 91 MG/DL (ref 65–99)
HCT VFR BLD AUTO: 45.4 % (ref 37.5–51)
HDLC SERPL-MCNC: 55 MG/DL
HGB BLD-MCNC: 14.7 G/DL (ref 13–17.7)
IMM GRANULOCYTES # BLD AUTO: 0 X10E3/UL (ref 0–0.1)
IMM GRANULOCYTES NFR BLD AUTO: 1 %
LDLC SERPL CALC-MCNC: 126 MG/DL (ref 0–99)
LYMPHOCYTES # BLD AUTO: 1.6 X10E3/UL (ref 0.7–3.1)
LYMPHOCYTES NFR BLD AUTO: 28 %
MCH RBC QN AUTO: 28.4 PG (ref 26.6–33)
MCHC RBC AUTO-ENTMCNC: 32.4 G/DL (ref 31.5–35.7)
MCV RBC AUTO: 88 FL (ref 79–97)
MONOCYTES # BLD AUTO: 0.5 X10E3/UL (ref 0.1–0.9)
MONOCYTES NFR BLD AUTO: 8 %
NEUTROPHILS # BLD AUTO: 3.3 X10E3/UL (ref 1.4–7)
NEUTROPHILS NFR BLD AUTO: 57 %
PLATELET # BLD AUTO: 220 X10E3/UL (ref 150–450)
POTASSIUM SERPL-SCNC: 4 MMOL/L (ref 3.5–5.2)
PROT SERPL-MCNC: 7.4 G/DL (ref 6–8.5)
PSA SERPL-MCNC: 0.4 NG/ML (ref 0–4)
RBC # BLD AUTO: 5.18 X10E6/UL (ref 4.14–5.8)
SODIUM SERPL-SCNC: 141 MMOL/L (ref 134–144)
TRIGL SERPL-MCNC: 439 MG/DL (ref 0–149)
TSH SERPL DL<=0.005 MIU/L-ACNC: 1.53 UIU/ML (ref 0.45–4.5)
VLDLC SERPL CALC-MCNC: 78 MG/DL (ref 5–40)
WBC # BLD AUTO: 5.7 X10E3/UL (ref 3.4–10.8)

## 2022-04-04 NOTE — PROGRESS NOTES
Please call, labs were great with the exception of his cholesterol levels which are elevated. Total cholesterol went from 208 to 259. I recommend he start a cholesterol medication to reduce his risk of heart attack or stroke. Let me know if he is agreeable.

## 2022-04-05 NOTE — PROGRESS NOTES
Rickey Guichochristian was called, informed of his lab results / advice per Dallas Castro, stated OK thanks and the medication can be called into [facility].

## 2022-04-06 RX ORDER — ROSUVASTATIN CALCIUM 20 MG/1
20 TABLET, COATED ORAL EVERY EVENING
Qty: 90 TABLET | Refills: 3 | Status: SHIPPED | OUTPATIENT
Start: 2022-04-06

## 2022-07-11 DIAGNOSIS — I10 ESSENTIAL HYPERTENSION: ICD-10-CM

## 2022-07-11 RX ORDER — AMLODIPINE AND OLMESARTAN MEDOXOMIL 5; 20 MG/1; MG/1
1 TABLET ORAL DAILY
Qty: 90 TABLET | Refills: 3 | Status: SHIPPED | OUTPATIENT
Start: 2022-07-11

## 2023-04-17 RX ORDER — ROSUVASTATIN CALCIUM 20 MG/1
20 TABLET, COATED ORAL EVERY EVENING
Qty: 90 TABLET | Refills: 3 | Status: SHIPPED | OUTPATIENT
Start: 2023-04-17

## 2023-06-20 ENCOUNTER — OFFICE VISIT (OUTPATIENT)
Age: 63
End: 2023-06-20
Payer: COMMERCIAL

## 2023-06-20 VITALS
OXYGEN SATURATION: 98 % | DIASTOLIC BLOOD PRESSURE: 68 MMHG | SYSTOLIC BLOOD PRESSURE: 110 MMHG | RESPIRATION RATE: 20 BRPM | HEIGHT: 70 IN | TEMPERATURE: 98.1 F | HEART RATE: 83 BPM | BODY MASS INDEX: 37.14 KG/M2 | WEIGHT: 259.4 LBS

## 2023-06-20 DIAGNOSIS — R10.13 EPIGASTRIC PAIN: Primary | ICD-10-CM

## 2023-06-20 DIAGNOSIS — I10 PRIMARY HYPERTENSION: ICD-10-CM

## 2023-06-20 DIAGNOSIS — K25.3 ACUTE GASTRIC ULCER WITHOUT HEMORRHAGE OR PERFORATION: ICD-10-CM

## 2023-06-20 DIAGNOSIS — R35.1 NOCTURIA: ICD-10-CM

## 2023-06-20 PROCEDURE — 99214 OFFICE O/P EST MOD 30 MIN: CPT | Performed by: NURSE PRACTITIONER

## 2023-06-20 PROCEDURE — 36415 COLL VENOUS BLD VENIPUNCTURE: CPT | Performed by: NURSE PRACTITIONER

## 2023-06-20 PROCEDURE — 3074F SYST BP LT 130 MM HG: CPT | Performed by: NURSE PRACTITIONER

## 2023-06-20 PROCEDURE — 3078F DIAST BP <80 MM HG: CPT | Performed by: NURSE PRACTITIONER

## 2023-06-20 RX ORDER — ESOMEPRAZOLE MAGNESIUM 40 MG/1
40 CAPSULE, DELAYED RELEASE ORAL
Qty: 60 CAPSULE | Refills: 0 | Status: SHIPPED | OUTPATIENT
Start: 2023-06-20 | End: 2023-08-19

## 2023-06-20 SDOH — ECONOMIC STABILITY: HOUSING INSECURITY
IN THE LAST 12 MONTHS, WAS THERE A TIME WHEN YOU DID NOT HAVE A STEADY PLACE TO SLEEP OR SLEPT IN A SHELTER (INCLUDING NOW)?: NO

## 2023-06-20 SDOH — ECONOMIC STABILITY: INCOME INSECURITY: HOW HARD IS IT FOR YOU TO PAY FOR THE VERY BASICS LIKE FOOD, HOUSING, MEDICAL CARE, AND HEATING?: NOT HARD AT ALL

## 2023-06-20 SDOH — ECONOMIC STABILITY: FOOD INSECURITY: WITHIN THE PAST 12 MONTHS, YOU WORRIED THAT YOUR FOOD WOULD RUN OUT BEFORE YOU GOT MONEY TO BUY MORE.: NEVER TRUE

## 2023-06-20 SDOH — ECONOMIC STABILITY: FOOD INSECURITY: WITHIN THE PAST 12 MONTHS, THE FOOD YOU BOUGHT JUST DIDN'T LAST AND YOU DIDN'T HAVE MONEY TO GET MORE.: NEVER TRUE

## 2023-06-20 ASSESSMENT — PATIENT HEALTH QUESTIONNAIRE - PHQ9
2. FEELING DOWN, DEPRESSED OR HOPELESS: 0
SUM OF ALL RESPONSES TO PHQ QUESTIONS 1-9: 0
SUM OF ALL RESPONSES TO PHQ9 QUESTIONS 1 & 2: 0
1. LITTLE INTEREST OR PLEASURE IN DOING THINGS: 0

## 2023-06-20 NOTE — PROGRESS NOTES
Vitals:    06/20/23 1302   BP: 110/68   Pulse: 83   Resp: 20   Temp: 98.1 °F (36.7 °C)   SpO2: 98%     1. \"Have you been to the ER, urgent care clinic since your last visit? Hospitalized since your last visit? \" No    2. \"Have you seen or consulted any other health care providers outside of the [address] [place] [address] since your last visit? \" No     3. For patients aged 39-70: Has the patient had a colonoscopy / FIT/ Cologuard? Yes - no Care Gap present      If the patient is female:    4. For patients aged 41-77: Has the patient had a mammogram within the past 2 years? NA - based on age or sex      11. For patients aged 21-65: Has the patient had a pap smear?  NA - based on age or sex

## 2023-06-20 NOTE — PROGRESS NOTES
Chief Complaint   Patient presents with    Abdominal Pain     upper    Bloated       ASSESSMENT AND PLAN:      Diagnosis Orders   1. Epigastric pain  esomeprazole (NEXIUM) 40 MG delayed release capsule      2. Acute gastric ulcer without hemorrhage or perforation  esomeprazole (NEXIUM) 40 MG delayed release capsule      3. Primary hypertension  CO COLLECTION VENOUS BLOOD VENIPUNCTURE    CBC with Auto Differential    Comprehensive Metabolic Panel    Lipid Panel    TSH    TSH    Lipid Panel    Comprehensive Metabolic Panel    CBC with Auto Differential      4. Nocturia  PSA, Diagnostic    PSA, Diagnostic          Let's do 2 months of PPI therapy. Avoid etoh, NSAIDs. Non-smoker. If no improvement, add carafate. If symptoms persist, may need EGD. Check up labs today, last done in March 2022. Patient aware of plan of care and verbalized understanding. Questions answered. RTC PRN. HPI:    Krysta Harrison is a 66 y.o. male in today for an acute visit. HTN: Compliant with amlodipine-olmesartan daily. Denies CP, HA, visual changes. He does not monitor BP at home. HLD: Complaint with rosuvastatin, not fasting today. New issues: In today for an acute visit with a CC of epigastric bloating, pain. He started on OTC Nexium with improvement. Symptoms started last week. Pain is worse on an empty stomach, improves with eating. No Known Allergies    Prior to Visit Medications    Medication Sig Taking?  Authorizing Provider   esomeprazole (NEXIUM) 40 MG delayed release capsule Take 1 capsule by mouth every morning (before breakfast) Yes [place] [facility], APRN - NP   amLODIPine-olmesartan (NATASHA) 5-20 MG per tablet Take 1 tablet by mouth daily Yes Ar Automatic Reconciliation   cetirizine (ZYRTEC) 10 MG tablet Take by mouth daily Yes Ar Automatic Reconciliation   rosuvastatin (CRESTOR) 20 MG tablet Take 1 tablet by mouth every evening Yes Ar Automatic Reconciliation       Past Medical History:   Diagnosis Date

## 2023-06-22 LAB
ALBUMIN SERPL-MCNC: 4.7 G/DL (ref 3.8–4.8)
ALBUMIN/GLOB SERPL: 1.9 {RATIO} (ref 1.2–2.2)
ALP SERPL-CCNC: 63 IU/L (ref 44–121)
ALT SERPL-CCNC: 24 IU/L (ref 0–44)
AST SERPL-CCNC: 20 IU/L (ref 0–40)
BASOPHILS # BLD AUTO: 0 X10E3/UL (ref 0–0.2)
BASOPHILS NFR BLD AUTO: 1 %
BILIRUB SERPL-MCNC: 0.3 MG/DL (ref 0–1.2)
BUN SERPL-MCNC: 18 MG/DL (ref 8–27)
BUN/CREAT SERPL: 17 (ref 10–24)
CALCIUM SERPL-MCNC: 9.3 MG/DL (ref 8.6–10.2)
CHLORIDE SERPL-SCNC: 105 MMOL/L (ref 96–106)
CHOLEST SERPL-MCNC: 186 MG/DL (ref 100–199)
CO2 SERPL-SCNC: 21 MMOL/L (ref 20–29)
CREAT SERPL-MCNC: 1.04 MG/DL (ref 0.76–1.27)
EGFRCR SERPLBLD CKD-EPI 2021: 82 ML/MIN/1.73
EOSINOPHIL # BLD AUTO: 0.2 X10E3/UL (ref 0–0.4)
EOSINOPHIL NFR BLD AUTO: 3 %
ERYTHROCYTE [DISTWIDTH] IN BLOOD BY AUTOMATED COUNT: 14.1 % (ref 11.6–15.4)
GLOBULIN SER CALC-MCNC: 2.5 G/DL (ref 1.5–4.5)
GLUCOSE SERPL-MCNC: 86 MG/DL (ref 70–99)
HCT VFR BLD AUTO: 42.5 % (ref 37.5–51)
HDLC SERPL-MCNC: 42 MG/DL
HGB BLD-MCNC: 14.1 G/DL (ref 13–17.7)
IMM GRANULOCYTES # BLD AUTO: 0.1 X10E3/UL (ref 0–0.1)
IMM GRANULOCYTES NFR BLD AUTO: 1 %
LDLC SERPL CALC-MCNC: 87 MG/DL (ref 0–99)
LYMPHOCYTES # BLD AUTO: 1.8 X10E3/UL (ref 0.7–3.1)
LYMPHOCYTES NFR BLD AUTO: 26 %
MCH RBC QN AUTO: 28.5 PG (ref 26.6–33)
MCHC RBC AUTO-ENTMCNC: 33.2 G/DL (ref 31.5–35.7)
MCV RBC AUTO: 86 FL (ref 79–97)
MONOCYTES # BLD AUTO: 0.6 X10E3/UL (ref 0.1–0.9)
MONOCYTES NFR BLD AUTO: 8 %
NEUTROPHILS # BLD AUTO: 4.1 X10E3/UL (ref 1.4–7)
NEUTROPHILS NFR BLD AUTO: 61 %
PLATELET # BLD AUTO: 260 X10E3/UL (ref 150–450)
POTASSIUM SERPL-SCNC: 4.5 MMOL/L (ref 3.5–5.2)
PROT SERPL-MCNC: 7.2 G/DL (ref 6–8.5)
PSA SERPL-MCNC: 0.6 NG/ML (ref 0–4)
RBC # BLD AUTO: 4.94 X10E6/UL (ref 4.14–5.8)
SODIUM SERPL-SCNC: 141 MMOL/L (ref 134–144)
TRIGL SERPL-MCNC: 349 MG/DL (ref 0–149)
TSH SERPL DL<=0.005 MIU/L-ACNC: 1.71 UIU/ML (ref 0.45–4.5)
VLDLC SERPL CALC-MCNC: 57 MG/DL (ref 5–40)
WBC # BLD AUTO: 6.7 X10E3/UL (ref 3.4–10.8)

## 2023-06-22 NOTE — RESULT ENCOUNTER NOTE
Labs look good. Cholesterol has really improved, down from 259 to 186. Sugar, kidneys, liver, electrolytes, thyroid all looked good.  PSA normal.

## 2023-07-10 RX ORDER — AMLODIPINE AND OLMESARTAN MEDOXOMIL 5; 20 MG/1; MG/1
1 TABLET ORAL DAILY
Qty: 90 TABLET | Refills: 1 | Status: SHIPPED | OUTPATIENT
Start: 2023-07-10

## 2023-08-15 DIAGNOSIS — R10.13 EPIGASTRIC PAIN: ICD-10-CM

## 2023-08-15 DIAGNOSIS — K25.3 ACUTE GASTRIC ULCER WITHOUT HEMORRHAGE OR PERFORATION: ICD-10-CM

## 2023-08-15 RX ORDER — ESOMEPRAZOLE MAGNESIUM 40 MG/1
CAPSULE, DELAYED RELEASE ORAL
Qty: 90 CAPSULE | Refills: 1 | OUTPATIENT
Start: 2023-08-15

## 2023-08-15 NOTE — TELEPHONE ENCOUNTER
Patient requesting refill on     Requested Prescriptions     Pending Prescriptions Disp Refills    esomeprazole (NEXIUM) 40 MG delayed release capsule [Pharmacy Med Name: ESOMEPRAZOLE MAGNESIUM 40MG DR CAPS] 60 capsule 0     Sig: TAKE 1 CAPSULE BY MOUTH EVERY MORNING BEFORE BREAKFAST        Last OV 6/20/2023

## 2023-11-10 RX ORDER — AMLODIPINE AND OLMESARTAN MEDOXOMIL 5; 20 MG/1; MG/1
1 TABLET ORAL DAILY
Qty: 90 TABLET | Refills: 1 | Status: SHIPPED | OUTPATIENT
Start: 2023-11-10

## 2023-12-05 RX ORDER — AMLODIPINE AND OLMESARTAN MEDOXOMIL 5; 20 MG/1; MG/1
1 TABLET ORAL DAILY
Qty: 90 TABLET | Refills: 1 | Status: SHIPPED | OUTPATIENT
Start: 2023-12-05

## 2024-02-05 RX ORDER — ROSUVASTATIN CALCIUM 20 MG/1
20 TABLET, COATED ORAL EVERY EVENING
Qty: 90 TABLET | Refills: 3 | Status: SHIPPED | OUTPATIENT
Start: 2024-02-05

## 2024-02-05 NOTE — TELEPHONE ENCOUNTER
Patient requesting refill on     Requested Prescriptions     Pending Prescriptions Disp Refills    rosuvastatin (CRESTOR) 20 MG tablet [Pharmacy Med Name: ROSUVASTATIN 20MG TABLETS] 90 tablet 0     Sig: TAKE 1 TABLET BY MOUTH EVERY EVENING        Last OV 6/20/2023

## 2024-10-14 ENCOUNTER — OFFICE VISIT (OUTPATIENT)
Age: 64
End: 2024-10-14
Payer: COMMERCIAL

## 2024-10-14 VITALS
HEIGHT: 70 IN | TEMPERATURE: 98.9 F | BODY MASS INDEX: 37.02 KG/M2 | DIASTOLIC BLOOD PRESSURE: 72 MMHG | WEIGHT: 258.6 LBS | RESPIRATION RATE: 18 BRPM | HEART RATE: 90 BPM | SYSTOLIC BLOOD PRESSURE: 128 MMHG | OXYGEN SATURATION: 96 %

## 2024-10-14 DIAGNOSIS — J06.9 VIRAL URI: Primary | ICD-10-CM

## 2024-10-14 DIAGNOSIS — T78.3XXA ANGIOEDEMA, INITIAL ENCOUNTER: ICD-10-CM

## 2024-10-14 PROCEDURE — 99214 OFFICE O/P EST MOD 30 MIN: CPT | Performed by: NURSE PRACTITIONER

## 2024-10-14 PROCEDURE — 96372 THER/PROPH/DIAG INJ SC/IM: CPT | Performed by: NURSE PRACTITIONER

## 2024-10-14 RX ORDER — PREDNISONE 20 MG/1
20 TABLET ORAL DAILY
Qty: 7 TABLET | Refills: 0 | Status: SHIPPED | OUTPATIENT
Start: 2024-10-14 | End: 2024-10-21

## 2024-10-14 RX ORDER — METHYLPREDNISOLONE ACETATE 40 MG/ML
40 INJECTION, SUSPENSION INTRA-ARTICULAR; INTRALESIONAL; INTRAMUSCULAR; SOFT TISSUE ONCE
Status: COMPLETED | OUTPATIENT
Start: 2024-10-14 | End: 2024-10-14

## 2024-10-14 RX ORDER — AZITHROMYCIN 250 MG/1
TABLET, FILM COATED ORAL
Qty: 6 TABLET | Refills: 0 | Status: SHIPPED | OUTPATIENT
Start: 2024-10-14 | End: 2024-10-24

## 2024-10-14 RX ORDER — DEXAMETHASONE SODIUM PHOSPHATE 4 MG/ML
4 INJECTION, SOLUTION INTRA-ARTICULAR; INTRALESIONAL; INTRAMUSCULAR; INTRAVENOUS; SOFT TISSUE ONCE
Status: COMPLETED | OUTPATIENT
Start: 2024-10-14 | End: 2024-10-14

## 2024-10-14 RX ADMIN — METHYLPREDNISOLONE ACETATE 40 MG: 40 INJECTION, SUSPENSION INTRA-ARTICULAR; INTRALESIONAL; INTRAMUSCULAR; SOFT TISSUE at 08:23

## 2024-10-14 RX ADMIN — DEXAMETHASONE SODIUM PHOSPHATE 4 MG: 4 INJECTION, SOLUTION INTRA-ARTICULAR; INTRALESIONAL; INTRAMUSCULAR; INTRAVENOUS; SOFT TISSUE at 08:23

## 2024-10-14 ASSESSMENT — PATIENT HEALTH QUESTIONNAIRE - PHQ9
2. FEELING DOWN, DEPRESSED OR HOPELESS: NOT AT ALL
SUM OF ALL RESPONSES TO PHQ QUESTIONS 1-9: 0
1. LITTLE INTEREST OR PLEASURE IN DOING THINGS: NOT AT ALL
SUM OF ALL RESPONSES TO PHQ QUESTIONS 1-9: 0
SUM OF ALL RESPONSES TO PHQ9 QUESTIONS 1 & 2: 0

## 2024-10-14 NOTE — PROGRESS NOTES
Chief Complaint   Patient presents with    Congestion       ASSESSMENT AND PLAN:       1. Viral URI    - methylPREDNISolone acetate (DEPO-MEDROL) injection 40 mg  - dexAMETHasone (DECADRON) injection 4 mg  - azithromycin (ZITHROMAX) 250 MG tablet; 500mg on day 1 followed by 250mg on days 2 - 5  Dispense: 6 tablet; Refill: 0  - predniSONE (DELTASONE) 20 MG tablet; Take 1 tablet by mouth daily for 7 days  Dispense: 7 tablet; Refill: 0    2. Angioedema, initial encounter    - methylPREDNISolone acetate (DEPO-MEDROL) injection 40 mg  - dexAMETHasone (DECADRON) injection 4 mg  - azithromycin (ZITHROMAX) 250 MG tablet; 500mg on day 1 followed by 250mg on days 2 - 5  Dispense: 6 tablet; Refill: 0  - predniSONE (DELTASONE) 20 MG tablet; Take 1 tablet by mouth daily for 7 days  Dispense: 7 tablet; Refill: 0     Tx as above. Increase cetirizine to BID dosing. Discussed angioedema at length, if any difficulty breathing or swallowing, proceed directly to the ER. He understands. Check in with me later this week.     Patient aware of plan of care and verbalized understanding. Questions answered. RTC PRN.    HPI:     is a 64 y.o. male in today for an acute visit with a CC of cough and congestion. Symptoms started a few weeks ago but really worsened this past week. He notes thick mucus production, wheezing, SOB. He notes lip swelling x several days, reports this occurs when he is ill and is chronic. Denies tongue swelling, difficulty swallowing. He has had allergy testing in the past d/t angioedema without identifiable cause. No sick contacts. Of note, the patient is a farmer, currently cutting crops.    No Known Allergies    Prior to Visit Medications    Medication Sig Taking? Authorizing Provider   azithromycin (ZITHROMAX) 250 MG tablet 500mg on day 1 followed by 250mg on days 2 - 5 Yes Letha Saunders, APRN - NP   predniSONE (DELTASONE) 20 MG tablet Take 1 tablet by mouth daily for 7 days Yes Letha Saunders,

## 2024-12-03 RX ORDER — AMLODIPINE AND OLMESARTAN MEDOXOMIL 5; 20 MG/1; MG/1
1 TABLET ORAL DAILY
Qty: 90 TABLET | Refills: 1 | Status: SHIPPED | OUTPATIENT
Start: 2024-12-03

## 2025-01-10 RX ORDER — ROSUVASTATIN CALCIUM 20 MG/1
20 TABLET, COATED ORAL EVERY EVENING
Qty: 90 TABLET | Refills: 3 | Status: SHIPPED | OUTPATIENT
Start: 2025-01-10

## 2025-05-14 RX ORDER — AMLODIPINE AND OLMESARTAN MEDOXOMIL 5; 20 MG/1; MG/1
1 TABLET ORAL DAILY
Qty: 90 TABLET | Refills: 1 | Status: SHIPPED | OUTPATIENT
Start: 2025-05-14